# Patient Record
Sex: FEMALE | Race: WHITE | NOT HISPANIC OR LATINO | URBAN - METROPOLITAN AREA
[De-identification: names, ages, dates, MRNs, and addresses within clinical notes are randomized per-mention and may not be internally consistent; named-entity substitution may affect disease eponyms.]

---

## 2017-03-28 ENCOUNTER — APPOINTMENT (RX ONLY)
Dept: URBAN - METROPOLITAN AREA CLINIC 77 | Facility: CLINIC | Age: 42
Setting detail: DERMATOLOGY
End: 2017-03-28

## 2017-03-28 DIAGNOSIS — L81.4 OTHER MELANIN HYPERPIGMENTATION: ICD-10-CM

## 2017-03-28 DIAGNOSIS — L82.1 OTHER SEBORRHEIC KERATOSIS: ICD-10-CM

## 2017-03-28 DIAGNOSIS — L72.0 EPIDERMAL CYST: ICD-10-CM

## 2017-03-28 DIAGNOSIS — D18.0 HEMANGIOMA: ICD-10-CM

## 2017-03-28 DIAGNOSIS — L90.5 SCAR CONDITIONS AND FIBROSIS OF SKIN: ICD-10-CM

## 2017-03-28 DIAGNOSIS — D22 MELANOCYTIC NEVI: ICD-10-CM

## 2017-03-28 DIAGNOSIS — Z41.9 ENCOUNTER FOR PROCEDURE FOR PURPOSES OTHER THAN REMEDYING HEALTH STATE, UNSPECIFIED: ICD-10-CM

## 2017-03-28 PROBLEM — D22.5 MELANOCYTIC NEVI OF TRUNK: Status: ACTIVE | Noted: 2017-03-28

## 2017-03-28 PROBLEM — D18.01 HEMANGIOMA OF SKIN AND SUBCUTANEOUS TISSUE: Status: ACTIVE | Noted: 2017-03-28

## 2017-03-28 PROBLEM — D22.62 MELANOCYTIC NEVI OF LEFT UPPER LIMB, INCLUDING SHOULDER: Status: ACTIVE | Noted: 2017-03-28

## 2017-03-28 PROCEDURE — ? PRESCRIPTION

## 2017-03-28 PROCEDURE — ? OBSERVATION AND MEASURE

## 2017-03-28 PROCEDURE — 99214 OFFICE O/P EST MOD 30 MIN: CPT | Mod: 25

## 2017-03-28 PROCEDURE — ? ACNE SURGERY

## 2017-03-28 PROCEDURE — ? BOTOX (U OR CC)

## 2017-03-28 PROCEDURE — 10040 EXTRACTION: CPT

## 2017-03-28 PROCEDURE — ? COUNSELING

## 2017-03-28 RX ORDER — POLYDIMETHYLSILOXANES/SILICON
GEL (GRAM) TOPICAL TWICE DAILY
Qty: 1 | Refills: 6 | Status: ERX | COMMUNITY
Start: 2017-03-28

## 2017-03-28 RX ADMIN — Medication: at 16:17

## 2017-03-28 ASSESSMENT — LOCATION SIMPLE DESCRIPTION DERM
LOCATION SIMPLE: CHEST
LOCATION SIMPLE: LEFT INDEX FINGER
LOCATION SIMPLE: RIGHT UPPER BACK
LOCATION SIMPLE: ABDOMEN
LOCATION SIMPLE: LEFT LIP
LOCATION SIMPLE: LEFT CHEEK
LOCATION SIMPLE: RIGHT CHEEK

## 2017-03-28 ASSESSMENT — LOCATION DETAILED DESCRIPTION DERM
LOCATION DETAILED: MIDDLE STERNUM
LOCATION DETAILED: LEFT RIB CAGE
LOCATION DETAILED: LEFT CENTRAL BUCCAL CHEEK
LOCATION DETAILED: EPIGASTRIC SKIN
LOCATION DETAILED: LEFT PROXIMAL DORSAL INDEX FINGER
LOCATION DETAILED: RIGHT LATERAL ABDOMEN
LOCATION DETAILED: LEFT MEDIAL BUCCAL CHEEK
LOCATION DETAILED: LEFT INFERIOR MEDIAL MALAR CHEEK
LOCATION DETAILED: LEFT NASOLABIAL FOLD
LOCATION DETAILED: RIGHT INFERIOR CENTRAL MALAR CHEEK
LOCATION DETAILED: RIGHT MID-UPPER BACK

## 2017-03-28 ASSESSMENT — LOCATION ZONE DERM
LOCATION ZONE: FINGER
LOCATION ZONE: LIP
LOCATION ZONE: FACE
LOCATION ZONE: TRUNK

## 2017-03-28 NOTE — PROCEDURE: BOTOX (U OR CC)
Document As Units Or Cc?: units
Periorbital Skin Units/Cc: 20
Masseter Units: 0
Topical Anesthesia?: BLT cream (benzocaine 20%, lidocaine 6%, tetracaine 4%)
Post-Care Instructions: Patient instructed to not lie down for 4 hours and limit physical activity for 24 hours. Patient instructed not to travel by airplane for 48 hours.
Dilution (U/0.1 Cc): 4
Consent: Written consent obtained. Risks include but not limited to lid/brow ptosis, bruising, swelling, diplopia, temporary effect, incomplete chemical denervation.
Forehead Units/Cc: 12
Detail Level: Zone

## 2017-03-28 NOTE — PROCEDURE: ACNE SURGERY
Render Post-Care Instructions In Note?: no
Post-Care Instructions: I reviewed with the patient in detail post-care instructions. Patient is to keep the treatment areas dry overnight, and then apply bacitracin twice daily until healed. Patient may apply hydrogen peroxide soaks to remove any crusting.
Detail Level: Detailed
Consent was obtained and risks were reviewed including but not limited to scarring, infection, bleeding, scabbing, incomplete removal, and allergy to anesthesia.
Extraction Method: electrocautery
Acne Type: Comedonal Lesions
Prep Text (Optional): Prior to removal the treatment areas were prepped in the usual fashion.

## 2017-04-18 ENCOUNTER — APPOINTMENT (RX ONLY)
Dept: URBAN - METROPOLITAN AREA CLINIC 46 | Facility: CLINIC | Age: 42
Setting detail: DERMATOLOGY
End: 2017-04-18

## 2017-04-18 DIAGNOSIS — Z41.9 ENCOUNTER FOR PROCEDURE FOR PURPOSES OTHER THAN REMEDYING HEALTH STATE, UNSPECIFIED: ICD-10-CM

## 2017-04-18 PROCEDURE — ? IPL COSMETIC

## 2017-04-18 ASSESSMENT — LOCATION DETAILED DESCRIPTION DERM
LOCATION DETAILED: SUPERIOR MID FOREHEAD
LOCATION DETAILED: PHILTRUM
LOCATION DETAILED: NASAL SUPRATIP
LOCATION DETAILED: LEFT CHIN
LOCATION DETAILED: LEFT INFERIOR CENTRAL MALAR CHEEK
LOCATION DETAILED: RIGHT INFERIOR CENTRAL MALAR CHEEK

## 2017-04-18 ASSESSMENT — LOCATION SIMPLE DESCRIPTION DERM
LOCATION SIMPLE: RIGHT CHEEK
LOCATION SIMPLE: NOSE
LOCATION SIMPLE: UPPER LIP
LOCATION SIMPLE: SUPERIOR FOREHEAD
LOCATION SIMPLE: LEFT CHEEK
LOCATION SIMPLE: CHIN

## 2017-04-18 ASSESSMENT — LOCATION ZONE DERM
LOCATION ZONE: LIP
LOCATION ZONE: FACE
LOCATION ZONE: NOSE

## 2017-04-18 NOTE — HPI: COSMETIC (LASER BROWN SPOTS)
Have You Had Laser Removal Of Brown Spots Before?: has not had previous treatment
When Outside In The Sun, Do You...: mostly tans, rarely burns

## 2017-05-16 ENCOUNTER — APPOINTMENT (RX ONLY)
Dept: URBAN - METROPOLITAN AREA CLINIC 77 | Facility: CLINIC | Age: 42
Setting detail: DERMATOLOGY
End: 2017-05-16

## 2017-05-16 DIAGNOSIS — Z41.9 ENCOUNTER FOR PROCEDURE FOR PURPOSES OTHER THAN REMEDYING HEALTH STATE, UNSPECIFIED: ICD-10-CM

## 2017-05-16 PROCEDURE — ? IPL COSMETIC

## 2017-05-16 ASSESSMENT — LOCATION SIMPLE DESCRIPTION DERM
LOCATION SIMPLE: CHIN
LOCATION SIMPLE: RIGHT FOREHEAD
LOCATION SIMPLE: NOSE
LOCATION SIMPLE: LEFT CHEEK
LOCATION SIMPLE: RIGHT CHEEK

## 2017-05-16 ASSESSMENT — LOCATION DETAILED DESCRIPTION DERM
LOCATION DETAILED: NASAL SUPRATIP
LOCATION DETAILED: RIGHT MEDIAL FOREHEAD
LOCATION DETAILED: RIGHT INFERIOR CENTRAL MALAR CHEEK
LOCATION DETAILED: LEFT CHIN
LOCATION DETAILED: LEFT INFERIOR CENTRAL MALAR CHEEK

## 2017-05-16 ASSESSMENT — LOCATION ZONE DERM
LOCATION ZONE: FACE
LOCATION ZONE: NOSE

## 2017-05-16 NOTE — PROCEDURE: IPL COSMETIC
Pulse Energy (Include Units): 12 and 13j/cm2 (spotx tx)
Price (Use Numbers Only, No Special Characters Or $): 200.00
Total Pulses: 211
Coolin% cooling
Number Of Passes: 1 and spot tx
Spotsize (Include Units): 5mm
Detail Level: Zone
Consent: Prior to the procedure consent obtained, risks reviewed including but not limited to crusting, scabbing, blistering, scarring, darker or lighter pigmentary change, incidental hair removal, bruising, and/or incomplete removal.
Pre-Procedure Care: Prior to the procedure the patient was cleansed with Aha/ Bha prior to procedure. Patient was present had protective eyewear in place and a warning sign was placed on the door.
End-Point And Post-Procedure Care: The procedure continued until mild purpura was noted.  Immediately following the procedure, Elta laser enzyme gel, spf and ice applied. Post care reviewed with patient.
Treatment Number: 2
Eye Protection: Laser Aid
Post-Care Instructions: I reviewed with the patient in detail post-care instructions. Patient should stay away from the sun and wear sun protection until treated areas are fully healed. Use La Roche Posey B5 Balm given in samples. Also after 2-3 days begin normal skincare regimen (gave her samples of hyroboost, CeraVe cleanser, skinbetter daily cream and lines cream).
Ipl Type: Venus Versa 
Fluence (Include Units): 10

## 2017-06-16 ENCOUNTER — APPOINTMENT (RX ONLY)
Dept: URBAN - METROPOLITAN AREA CLINIC 77 | Facility: CLINIC | Age: 42
Setting detail: DERMATOLOGY
End: 2017-06-16

## 2017-06-16 DIAGNOSIS — Z41.9 ENCOUNTER FOR PROCEDURE FOR PURPOSES OTHER THAN REMEDYING HEALTH STATE, UNSPECIFIED: ICD-10-CM

## 2017-06-16 PROCEDURE — ? BOTOX (U OR CC)

## 2017-06-16 PROCEDURE — ? OTHER

## 2017-06-16 PROCEDURE — ? FILLERS (CC)

## 2017-06-16 ASSESSMENT — LOCATION SIMPLE DESCRIPTION DERM
LOCATION SIMPLE: RIGHT CHEEK
LOCATION SIMPLE: RIGHT LIP

## 2017-06-16 ASSESSMENT — LOCATION DETAILED DESCRIPTION DERM
LOCATION DETAILED: RIGHT SUPERIOR LATERAL MALAR CHEEK
LOCATION DETAILED: RIGHT UPPER CUTANEOUS LIP

## 2017-06-16 ASSESSMENT — LOCATION ZONE DERM
LOCATION ZONE: LIP
LOCATION ZONE: FACE

## 2017-06-16 NOTE — PROCEDURE: BOTOX (U OR CC)
Dilution (U/0.1 Cc): 4
Periorbital Skin Units/Cc: 12
Consent: Written consent obtained. Risks include but not limited to lid/brow ptosis, bruising, swelling, diplopia, temporary effect, incomplete chemical denervation.
Additional Area 4 Units: 0
Detail Level: Zone
Document As Units Or Cc?: units
Expiration Date (Month Year): 12/2019
Post-Care Instructions: Patient instructed to not lie down for 4 hours and limit physical activity for 24 hours. Patient instructed not to travel by airplane for 48 hours.
Lot #: [id number]
Quantity Per Injection Site (Units Or Cc): 2 U

## 2017-06-16 NOTE — PROCEDURE: FILLERS (CC)
Post-Care Instructions: Patient instructed to apply ice to reduce swelling.
Topical Anesthesia?: BLT cream (benzocaine 20%, lidocaine 6%, tetracaine 4%)
Lot #: [id number]
Marionette Lines Filler  Volume In Cc: 0
Detail Level: Detailed
Consent: Written consent obtained. Risks include but not limited to bruising, beading, irregular texture, ulceration, infection, allergic reaction, scar formation, incomplete augmentation, temporary nature, procedural pain.
Expiration Date (Month Year): 01/22/19
Filler: Juvederm Vollure XC
Quantity Per Injection Site (Cc): 1.0 cc

## 2017-06-16 NOTE — PROCEDURE: OTHER
Detail Level: Zone
Other (Free Text): Recommended voluma and sculptura for volume loss on the cheek bones
Note Text (......Xxx Chief Complaint.): This diagnosis correlates with the

## 2017-09-21 ENCOUNTER — APPOINTMENT (RX ONLY)
Dept: URBAN - METROPOLITAN AREA CLINIC 77 | Facility: CLINIC | Age: 42
Setting detail: DERMATOLOGY
End: 2017-09-21

## 2017-09-21 DIAGNOSIS — Z41.9 ENCOUNTER FOR PROCEDURE FOR PURPOSES OTHER THAN REMEDYING HEALTH STATE, UNSPECIFIED: ICD-10-CM

## 2017-09-21 PROCEDURE — ? BOTOX (U OR CC)

## 2017-09-21 ASSESSMENT — LOCATION DETAILED DESCRIPTION DERM: LOCATION DETAILED: LEFT INFERIOR MEDIAL FOREHEAD

## 2017-09-21 ASSESSMENT — LOCATION SIMPLE DESCRIPTION DERM: LOCATION SIMPLE: LEFT FOREHEAD

## 2017-09-21 ASSESSMENT — LOCATION ZONE DERM: LOCATION ZONE: FACE

## 2017-09-21 NOTE — PROCEDURE: BOTOX (U OR CC)
Depressor Anguli Oris Units: 0
Post-Care Instructions: Patient instructed to not lie down for 4 hours and limit physical activity for 24 hours. Patient instructed not to travel by airplane for 48 hours.
Detail Level: Zone
Expiration Date (Month Year): 04/2020
Lot #: [id number]
Forehead Units/Cc: 10
Price (Use Numbers Only, No Special Characters Or $): 12
Dilution (U/0.1 Cc): 4
Periorbital Skin Units/Cc: 20
Glabellar Complex Units: 24
Document As Units Or Cc?: units
Consent: Written consent obtained. Risks include but not limited to lid/brow ptosis, bruising, swelling, diplopia, temporary effect, incomplete chemical denervation.

## 2017-09-21 NOTE — PROCEDURE: BOTOX (U OR CC)
Dilution (U/0.1 Cc): 4
Levator Labii Superioris Units: 0
Document As Units Or Cc?: units
Lot #: [id number]
Forehead Units/Cc: 10
Consent: Written consent obtained. Risks include but not limited to lid/brow ptosis, bruising, swelling, diplopia, temporary effect, incomplete chemical denervation.
Periorbital Skin Units/Cc: 20
Price (Use Numbers Only, No Special Characters Or $): 12
Glabellar Complex Units: 24
Post-Care Instructions: Patient instructed to not lie down for 4 hours and limit physical activity for 24 hours. Patient instructed not to travel by airplane for 48 hours.
Expiration Date (Month Year): 2/2020
Detail Level: Zone
Quantity Per Injection Site (Units Or Cc): 4 U

## 2018-01-04 ENCOUNTER — APPOINTMENT (RX ONLY)
Dept: URBAN - METROPOLITAN AREA CLINIC 77 | Facility: CLINIC | Age: 43
Setting detail: DERMATOLOGY
End: 2018-01-04

## 2018-01-04 DIAGNOSIS — Z41.9 ENCOUNTER FOR PROCEDURE FOR PURPOSES OTHER THAN REMEDYING HEALTH STATE, UNSPECIFIED: ICD-10-CM

## 2018-01-04 PROCEDURE — ? OTHER

## 2018-01-04 ASSESSMENT — LOCATION ZONE DERM: LOCATION ZONE: FACE

## 2018-01-04 ASSESSMENT — LOCATION SIMPLE DESCRIPTION DERM: LOCATION SIMPLE: RIGHT CHEEK

## 2018-01-04 ASSESSMENT — LOCATION DETAILED DESCRIPTION DERM: LOCATION DETAILED: RIGHT SUPERIOR LATERAL MALAR CHEEK

## 2018-01-09 ENCOUNTER — APPOINTMENT (RX ONLY)
Dept: URBAN - METROPOLITAN AREA CLINIC 77 | Facility: CLINIC | Age: 43
Setting detail: DERMATOLOGY
End: 2018-01-09

## 2018-01-09 DIAGNOSIS — Z41.9 ENCOUNTER FOR PROCEDURE FOR PURPOSES OTHER THAN REMEDYING HEALTH STATE, UNSPECIFIED: ICD-10-CM

## 2018-01-09 PROCEDURE — ? SCULPTRA

## 2018-01-09 ASSESSMENT — LOCATION SIMPLE DESCRIPTION DERM
LOCATION SIMPLE: RIGHT TEMPLE
LOCATION SIMPLE: LEFT FOREHEAD
LOCATION SIMPLE: RIGHT CHEEK
LOCATION SIMPLE: LEFT CHEEK

## 2018-01-09 ASSESSMENT — LOCATION DETAILED DESCRIPTION DERM
LOCATION DETAILED: LEFT MEDIAL MALAR CHEEK
LOCATION DETAILED: RIGHT CENTRAL TEMPLE
LOCATION DETAILED: RIGHT CENTRAL MALAR CHEEK
LOCATION DETAILED: LEFT INFERIOR FOREHEAD
LOCATION DETAILED: RIGHT SUPERIOR LATERAL MALAR CHEEK
LOCATION DETAILED: LEFT SUPERIOR CENTRAL MALAR CHEEK

## 2018-01-09 ASSESSMENT — LOCATION ZONE DERM: LOCATION ZONE: FACE

## 2018-01-09 NOTE — PROCEDURE: SCULPTRA
Dilution Method: The Sculptra was diluted with 5 ml of sterile water for a total volume of 9ccs for each vial.
Dorsal Hands Sculptra Volume In Cc: 0
Post-Care Instructions: Patient instructed to apply ice to reduce swelling.
Lot #: 
Expiration Date (Month Year): 03/31/2020
Cheeks Volume In Cc: 6
Use Map Statement For Sites (Optional): No
Treatment Number: 1
Consent: Written consent obtained. Risks include but not limited to bruising, beading, irregular texture, ulceration, infection, allergic reaction, scar formation, incomplete augmentation, temporary nature, procedural pain.
Volumizer: Sculptra
Temple Hollows Volume In Cc: 2
Render Post-Care In The Note: Yes
Detail Level: Simple
Injection Technique: The Sculptra was injected to the listed areas after cleansing the skin and providing appropriate anesthesia.
Map Statement: See Attached Map for Complete Details.

## 2018-04-26 ENCOUNTER — APPOINTMENT (RX ONLY)
Dept: URBAN - METROPOLITAN AREA CLINIC 77 | Facility: CLINIC | Age: 43
Setting detail: DERMATOLOGY
End: 2018-04-26

## 2018-04-26 DIAGNOSIS — Z41.9 ENCOUNTER FOR PROCEDURE FOR PURPOSES OTHER THAN REMEDYING HEALTH STATE, UNSPECIFIED: ICD-10-CM

## 2018-04-26 PROCEDURE — ? BOTOX

## 2018-04-26 PROCEDURE — ? PRESCRIPTION

## 2018-04-26 PROCEDURE — ? KYBELLA INJECTION

## 2018-04-26 PROCEDURE — ? COSMETIC CONSULTATION: BOTULINUM TOXIN

## 2018-04-26 RX ORDER — PREDNISONE 20 MG/1
TABLET ORAL
Qty: 3 | Refills: 0 | Status: ERX | COMMUNITY
Start: 2018-04-26

## 2018-04-26 RX ADMIN — PREDNISONE: 20 TABLET ORAL at 20:02

## 2018-04-26 NOTE — PROCEDURE: BOTOX
Masseter Units: 0
Price (Use Numbers Only, No Special Characters Or $): 12
Consent: Written consent obtained. Risks include but not limited to lid/brow ptosis, bruising, swelling, diplopia, temporary effect, incomplete chemical denervation.
Additional Area 1 Units: 8
Dilution (U/0.1 Cc): 4
Detail Level: Detailed
Lot #: [id number]
Additional Area 1 Location: Brow Lift
Post-Care Instructions: Patient instructed to not lie down for 4 hours and limit physical activity for 24 hours. Patient instructed not to travel by airplane for 48 hours.
Expiration Date (Month Year): 06/2020
Periorbital Skin Units: 16

## 2018-04-26 NOTE — PROCEDURE: KYBELLA INJECTION
Anesthesia Volume In Cc: 0.5
Price (Use Numbers Only, No Special Characters Or $): 366
Kybella Volume In Cc (Won't Render If 0): 0
Treatment Number (Won't Render If 0): 1
Treatment Area: Submental Chin
Detail Level: Detailed
Expiration Date (Month Year): Aug. 2018
Lot #: [id number]
Post-Care Instructions: Patient instructed to apply ice to reduce swelling.
Topical Anesthesia?: 20% benzocaine, 8% lidocaine, and 8% tetracaine
Procedure Text: Name of Medication: Kybella (Deoxycholic Acid). \\nDose: 20mg/2mL. \\nRoute: Subcutaneous. \\nLocation: Submental fat. \\nAmount used: 1 vial.\\n\\nTopical anesthesia was achieved with 20% benzocaine, 8% lidocaine, 4% tetracaine. Patient instructed to apply ice to reduce swelling. \\n\\nPrior to treatment, palpated the submental area to ensure sufficient submental fat in the target treatment area. Outline the planned treatment area with a surgical pen and applied a 1 cm injection grid to lyubov the injection sites; made sure to not inject outside the defined parameters. \\n\\nUsing a large bore needle, earnestine 1 mL at a time from Kybella vial into a sterile 1 mL syringe. \\n\\nPatient was asked to tense the platysma. While pinching the submental fat and, using a 30 gauge and 0.5 inch needle, injected 0.2 mL into the preplatysmal fat next to each of the marked injection sites by advancing the needle perpendicular to the skin. Injected into fat tissue at the depth of approximately mid-way into the subcutaneous fat layer; avoiding injection into the postplatysmal fat. Upon needle withdrawal, pressure was applied to each injection site as necessary to minimize bleeding; an adhesive dressing was applied. Ice/cold packs encouraged to help with pain and swelling. \\n\\nWritten consent obtained. Risks include but not limited to bruising, beading, irregular texture, ulceration, infection, allergic reaction, scar formation, incomplete augmentation, temporary nature, procedural pain. \\n\\nAdministrated by: Dr. Lim. \\n\\nPt tolerated well. Pt was observed for 30 minutes following injection for adverse reaction
Consent: Written consent obtained. Risks include but not limited to bruising, beading, irregular texture, ulceration, infection, allergic reaction, scar formation, incomplete augmentation, temporary nature, procedural pain.

## 2018-08-16 ENCOUNTER — APPOINTMENT (RX ONLY)
Dept: URBAN - METROPOLITAN AREA CLINIC 77 | Facility: CLINIC | Age: 43
Setting detail: DERMATOLOGY
End: 2018-08-16

## 2018-08-16 DIAGNOSIS — Z41.9 ENCOUNTER FOR PROCEDURE FOR PURPOSES OTHER THAN REMEDYING HEALTH STATE, UNSPECIFIED: ICD-10-CM

## 2018-08-16 DIAGNOSIS — L81.1 CHLOASMA: ICD-10-CM

## 2018-08-16 DIAGNOSIS — L72.8 OTHER FOLLICULAR CYSTS OF THE SKIN AND SUBCUTANEOUS TISSUE: ICD-10-CM

## 2018-08-16 DIAGNOSIS — E85.4 ORGAN-LIMITED AMYLOIDOSIS: ICD-10-CM

## 2018-08-16 PROBLEM — D48.5 NEOPLASM OF UNCERTAIN BEHAVIOR OF SKIN: Status: ACTIVE | Noted: 2018-08-16

## 2018-08-16 PROCEDURE — ? PRESCRIPTION

## 2018-08-16 PROCEDURE — 99213 OFFICE O/P EST LOW 20 MIN: CPT

## 2018-08-16 PROCEDURE — ? COUNSELING

## 2018-08-16 PROCEDURE — ? BOTOX

## 2018-08-16 PROCEDURE — ? RECOMMENDATIONS

## 2018-08-16 PROCEDURE — ? TREATMENT REGIMEN

## 2018-08-16 PROCEDURE — ? COSMETIC CONSULTATION: BOTULINUM TOXIN

## 2018-08-16 RX ORDER — TRETINOIN 0.25 MG/G
CREAM TOPICAL QHS
Qty: 1 | Refills: 6 | Status: ERX | COMMUNITY
Start: 2018-08-16

## 2018-08-16 RX ORDER — TRIAMCINOLONE ACETONIDE 5 MG/G
CREAM TOPICAL
Qty: 1 | Refills: 6 | Status: ERX | COMMUNITY
Start: 2018-08-16

## 2018-08-16 RX ADMIN — TRIAMCINOLONE ACETONIDE: 5 CREAM TOPICAL at 19:55

## 2018-08-16 RX ADMIN — TRETINOIN: 0.25 CREAM TOPICAL at 20:07

## 2018-08-16 ASSESSMENT — LOCATION SIMPLE DESCRIPTION DERM
LOCATION SIMPLE: INFERIOR FOREHEAD
LOCATION SIMPLE: RIGHT POSTERIOR UPPER ARM
LOCATION SIMPLE: LEFT CHEEK
LOCATION SIMPLE: UPPER BACK

## 2018-08-16 ASSESSMENT — LOCATION ZONE DERM
LOCATION ZONE: TRUNK
LOCATION ZONE: FACE
LOCATION ZONE: ARM

## 2018-08-16 ASSESSMENT — LOCATION DETAILED DESCRIPTION DERM
LOCATION DETAILED: INFERIOR THORACIC SPINE
LOCATION DETAILED: LEFT LATERAL BUCCAL CHEEK
LOCATION DETAILED: INFERIOR MID FOREHEAD
LOCATION DETAILED: RIGHT DISTAL POSTERIOR UPPER ARM

## 2018-08-16 NOTE — HPI: SKIN LESION
Is This A New Presentation, Or A Follow-Up?: Skin Lesion
Has Your Skin Lesion Been Treated?: not been treated
Additional History: Pt reports lesion is smaller than it originally started out as. Pt denies any drainage

## 2018-08-16 NOTE — PROCEDURE: RECOMMENDATIONS
Detail Level: Zone
Recommendation Preamble: The following recommendations were made during the visit: Referral for sculptura on the cheeks in September

## 2018-08-16 NOTE — HPI: DISCOLORATION (MELASMA)
How Severe Are They?: moderate
Is This A New Presentation, Or A Follow-Up?: Discoloration
Additional History: Pt would like to discuss treatment options

## 2018-08-16 NOTE — HPI: RASH (PIGMENTED PURPURA DERMATOSIS)
How Severe Is It?: mild
Is This A New Presentation, Or A Follow-Up?: Rash
Additional History: Pt reports her mother has a similar rash and believed it is most common in Hispanics. She would like to discuss treatment options today

## 2018-08-16 NOTE — PROCEDURE: BOTOX
Glabellar Complex Units: 0
Consent: Written consent obtained. Risks include but not limited to lid/brow ptosis, bruising, swelling, diplopia, temporary effect, incomplete chemical denervation.
Forehead Units: 16
Lot #: [id number]
Expiration Date (Month Year): 11/2020
Periorbital Skin Units: 24
Price (Use Numbers Only, No Special Characters Or $): 12
Dilution (U/0.1 Cc): 4
Detail Level: Detailed
Post-Care Instructions: Patient instructed to not lie down for 4 hours and limit physical activity for 24 hours. Patient instructed not to travel by airplane for 48 hours.

## 2018-09-18 ENCOUNTER — APPOINTMENT (RX ONLY)
Dept: URBAN - METROPOLITAN AREA CLINIC 77 | Facility: CLINIC | Age: 43
Setting detail: DERMATOLOGY
End: 2018-09-18

## 2018-09-18 DIAGNOSIS — Z41.9 ENCOUNTER FOR PROCEDURE FOR PURPOSES OTHER THAN REMEDYING HEALTH STATE, UNSPECIFIED: ICD-10-CM

## 2018-09-18 PROCEDURE — ? SCULPTRA

## 2018-09-18 ASSESSMENT — LOCATION DETAILED DESCRIPTION DERM
LOCATION DETAILED: RIGHT CENTRAL MALAR CHEEK
LOCATION DETAILED: LEFT CENTRAL MALAR CHEEK

## 2018-09-18 ASSESSMENT — LOCATION ZONE DERM: LOCATION ZONE: FACE

## 2018-09-18 ASSESSMENT — LOCATION SIMPLE DESCRIPTION DERM
LOCATION SIMPLE: LEFT CHEEK
LOCATION SIMPLE: RIGHT CHEEK

## 2018-09-18 NOTE — PROCEDURE: SCULPTRA
Treatment Number: 2
Additional Area 1 Volume In Cc: 0
Lot #: 
Anesthesia Volume In Cc: 0.5
Map Statement: See Attached Map for Complete Details.
Consent: Written consent obtained. Risks include but not limited to bruising, beading, irregular texture, ulceration, infection, allergic reaction, scar formation, incomplete augmentation, temporary nature, procedural pain.
Render Post-Care In The Note: Yes
Post-Care Instructions: Patient instructed to apply ice to reduce swelling.
Use Map Statement For Sites (Optional): No
Injection Technique: The Sculptra was injected to the listed areas after cleansing the skin and providing appropriate anesthesia.
Price (Use Numbers Only, No Special Characters Or $): 514
Expiration Date (Month Year): 10/31/2020
Cheeks Volume In Cc: 4
Dilution Method: The Sculptra was diluted with 5 ml of sterile water for a total volume of 9ccs for each vial.
Additional Anesthesia Volume In Cc: 6
Vials Reconstituted: 1
Volumizer: Sculptra
Detail Level: Detailed
Anesthesia Type: 1% lidocaine with epinephrine
Topical Anesthesia?: BLT cream (benzocaine 20%, lidocaine 10%, tetracaine 10%)

## 2019-01-29 ENCOUNTER — APPOINTMENT (RX ONLY)
Dept: URBAN - METROPOLITAN AREA CLINIC 77 | Facility: CLINIC | Age: 44
Setting detail: DERMATOLOGY
End: 2019-01-29

## 2019-01-29 DIAGNOSIS — Z41.9 ENCOUNTER FOR PROCEDURE FOR PURPOSES OTHER THAN REMEDYING HEALTH STATE, UNSPECIFIED: ICD-10-CM

## 2019-01-29 DIAGNOSIS — L72.0 EPIDERMAL CYST: ICD-10-CM

## 2019-01-29 PROCEDURE — ? COSMETIC CONSULTATION: BOTULINUM TOXIN

## 2019-01-29 PROCEDURE — ? BOTOX

## 2019-01-29 PROCEDURE — ? COUNSELING

## 2019-01-29 PROCEDURE — ? OTHER

## 2019-01-29 ASSESSMENT — LOCATION DETAILED DESCRIPTION DERM
LOCATION DETAILED: LEFT MEDIAL FOREHEAD
LOCATION DETAILED: LEFT LOWER CUTANEOUS LIP
LOCATION DETAILED: LEFT CENTRAL MALAR CHEEK

## 2019-01-29 ASSESSMENT — LOCATION ZONE DERM
LOCATION ZONE: FACE
LOCATION ZONE: LIP

## 2019-01-29 ASSESSMENT — LOCATION SIMPLE DESCRIPTION DERM
LOCATION SIMPLE: LEFT FOREHEAD
LOCATION SIMPLE: LEFT LIP
LOCATION SIMPLE: LEFT CHEEK

## 2019-01-29 NOTE — PROCEDURE: OTHER
Other (Free Text): Courtesy ED and manual extraction performed
Note Text (......Xxx Chief Complaint.): This diagnosis correlates with the
Detail Level: Detailed

## 2019-01-29 NOTE — PROCEDURE: BOTOX
Additional Area 2 Units: 0
Consent: Written consent obtained. Risks include but not limited to lid/brow ptosis, bruising, swelling, diplopia, temporary effect, incomplete chemical denervation.
Periorbital Skin Units: 24
Detail Level: Detailed
Dilution (U/0.1 Cc): 4
Glabellar Complex Units: 12
Post-Care Instructions: Patient instructed to not lie down for 4 hours and limit physical activity for 24 hours. Patient instructed not to travel by airplane for 48 hours.
Forehead Units: 16

## 2019-05-02 ENCOUNTER — APPOINTMENT (RX ONLY)
Dept: URBAN - METROPOLITAN AREA CLINIC 77 | Facility: CLINIC | Age: 44
Setting detail: DERMATOLOGY
End: 2019-05-02

## 2019-05-02 DIAGNOSIS — Z41.9 ENCOUNTER FOR PROCEDURE FOR PURPOSES OTHER THAN REMEDYING HEALTH STATE, UNSPECIFIED: ICD-10-CM

## 2019-05-02 DIAGNOSIS — B36.0 PITYRIASIS VERSICOLOR: ICD-10-CM

## 2019-05-02 PROCEDURE — ? COUNSELING

## 2019-05-02 PROCEDURE — ? PRESCRIPTION

## 2019-05-02 PROCEDURE — ? BOTOX

## 2019-05-02 PROCEDURE — ? COSMETIC CONSULTATION: BOTULINUM TOXIN

## 2019-05-02 RX ORDER — FLUCONAZOLE 150 MG/1
TABLET ORAL
Qty: 15 | Refills: 0 | Status: ERX | COMMUNITY
Start: 2019-05-02

## 2019-05-02 RX ORDER — ECONAZOLE NITRATE 10 MG/G
CREAM TOPICAL
Qty: 1 | Refills: 6 | Status: ERX | COMMUNITY
Start: 2019-05-02

## 2019-05-02 RX ADMIN — FLUCONAZOLE: 150 TABLET ORAL at 15:35

## 2019-05-02 RX ADMIN — ECONAZOLE NITRATE: 10 CREAM TOPICAL at 15:31

## 2019-05-02 ASSESSMENT — LOCATION DETAILED DESCRIPTION DERM: LOCATION DETAILED: EPIGASTRIC SKIN

## 2019-05-02 ASSESSMENT — LOCATION ZONE DERM: LOCATION ZONE: TRUNK

## 2019-05-02 ASSESSMENT — LOCATION SIMPLE DESCRIPTION DERM: LOCATION SIMPLE: ABDOMEN

## 2019-05-02 NOTE — PROCEDURE: BOTOX
Dilution (U/0.1 Cc): 4
Additional Area 6 Units: 0
Price (Use Numbers Only, No Special Characters Or $): 13
Periorbital Skin Units: 24
Consent: Written consent obtained. Risks include but not limited to lid/brow ptosis, bruising, swelling, diplopia, temporary effect, incomplete chemical denervation.
Post-Care Instructions: Patient instructed to not lie down for 4 hours and limit physical activity for 24 hours. Patient instructed not to travel by airplane for 48 hours.
Lot #: [id number]
Expiration Date (Month Year): 08/2021
Detail Level: Detailed

## 2019-08-08 ENCOUNTER — APPOINTMENT (RX ONLY)
Dept: URBAN - METROPOLITAN AREA CLINIC 77 | Facility: CLINIC | Age: 44
Setting detail: DERMATOLOGY
End: 2019-08-08

## 2019-08-08 DIAGNOSIS — L90.5 SCAR CONDITIONS AND FIBROSIS OF SKIN: ICD-10-CM

## 2019-08-08 DIAGNOSIS — Z41.9 ENCOUNTER FOR PROCEDURE FOR PURPOSES OTHER THAN REMEDYING HEALTH STATE, UNSPECIFIED: ICD-10-CM

## 2019-08-08 PROCEDURE — ? COUNSELING

## 2019-08-08 PROCEDURE — ? PRESCRIPTION

## 2019-08-08 PROCEDURE — ? TREATMENT REGIMEN

## 2019-08-08 PROCEDURE — ? COSMETIC CONSULTATION: BOTULINUM TOXIN

## 2019-08-08 PROCEDURE — ? BOTOX

## 2019-08-08 RX ORDER — POLYDIMETHYLSILOXANES/SILICON
GEL (GRAM) TOPICAL
Qty: 1 | Refills: 6 | Status: ERX

## 2019-08-08 ASSESSMENT — LOCATION ZONE DERM
LOCATION ZONE: TRUNK
LOCATION ZONE: FACE

## 2019-08-08 ASSESSMENT — LOCATION SIMPLE DESCRIPTION DERM
LOCATION SIMPLE: RIGHT BREAST
LOCATION SIMPLE: LEFT FOREHEAD
LOCATION SIMPLE: LEFT BREAST

## 2019-08-08 ASSESSMENT — LOCATION DETAILED DESCRIPTION DERM
LOCATION DETAILED: LEFT MEDIAL FOREHEAD
LOCATION DETAILED: LEFT INFRAMAMMARY CREASE
LOCATION DETAILED: RIGHT INFRAMAMMARY CREASE (INNER QUADRANT)

## 2019-08-08 NOTE — PROCEDURE: BOTOX
Additional Area 5 Units: 0
Post-Care Instructions: Patient instructed to not lie down for 4 hours and limit physical activity for 24 hours. Patient instructed not to travel by airplane for 48 hours.
Expiration Date (Month Year): 06/2020
Consent: Written consent obtained. Risks include but not limited to lid/brow ptosis, bruising, swelling, diplopia, temporary effect, incomplete chemical denervation.
Price (Use Numbers Only, No Special Characters Or $): 13
Dilution (U/0.1 Cc): 4
Forehead Units: 8
Periorbital Skin Units: 24
Glabellar Complex Units: 6
Lot #: [id number]
Detail Level: Zone

## 2019-08-08 NOTE — HPI: OTHER
Condition:: Cosmetic (botox)
Please Describe Your Condition:: comes in for Botox of the forehead, glabella, periorbital skin for wrinkles. She has had botox (5/2/2019) with the following response: improvement of wrinkles and no complications. Pertinent history includes: no history of a neuromuscular condition (e.g.: myasthenia gravis), no history of autoimmune disease, no history of hepatitis b infection, no history of hepatitis c infection, no history of HIV infection, and not currently breast feeding.

## 2019-11-21 ENCOUNTER — APPOINTMENT (RX ONLY)
Dept: URBAN - METROPOLITAN AREA CLINIC 77 | Facility: CLINIC | Age: 44
Setting detail: DERMATOLOGY
End: 2019-11-21

## 2019-11-21 DIAGNOSIS — Z41.9 ENCOUNTER FOR PROCEDURE FOR PURPOSES OTHER THAN REMEDYING HEALTH STATE, UNSPECIFIED: ICD-10-CM

## 2019-11-21 PROCEDURE — ? BOTOX

## 2019-11-21 PROCEDURE — ? COSMETIC CONSULTATION: BOTULINUM TOXIN

## 2019-11-21 ASSESSMENT — LOCATION SIMPLE DESCRIPTION DERM: LOCATION SIMPLE: LEFT FOREHEAD

## 2019-11-21 ASSESSMENT — LOCATION DETAILED DESCRIPTION DERM: LOCATION DETAILED: LEFT MEDIAL FOREHEAD

## 2019-11-21 ASSESSMENT — LOCATION ZONE DERM: LOCATION ZONE: FACE

## 2019-11-21 NOTE — PROCEDURE: BOTOX
Consent: Written consent obtained. Risks include but not limited to lid/brow ptosis, bruising, swelling, diplopia, temporary effect, incomplete chemical denervation.
Additional Area 1 Units: 0
Dilution (U/0.1 Cc): 4
Lot #: [id number]
Periorbital Skin Units: 24
Price (Use Numbers Only, No Special Characters Or $): 13
Expiration Date (Month Year): 06/2020
Detail Level: Zone
Post-Care Instructions: Patient instructed to not lie down for 4 hours and limit physical activity for 24 hours. Patient instructed not to travel by airplane for 48 hours.

## 2019-11-21 NOTE — HPI: OTHER
Condition:: Botox
Please Describe Your Condition:: comes in for Botox of the periorbital skin for wrinkles. She has had botox (8/8/19) with the following response: improvement of wrinkles and no complications. Pertinent history includes: no history of a neuromuscular condition (e.g.: myasthenia gravis), no history of autoimmune disease, no history of hepatitis b infection, no history of hepatitis c infection, no history of HIV infection, and *  not currently breast feeding.

## 2020-06-16 ENCOUNTER — APPOINTMENT (RX ONLY)
Dept: URBAN - METROPOLITAN AREA CLINIC 77 | Facility: CLINIC | Age: 45
Setting detail: DERMATOLOGY
End: 2020-06-16

## 2020-06-16 DIAGNOSIS — L65.8 OTHER SPECIFIED NONSCARRING HAIR LOSS: ICD-10-CM

## 2020-06-16 DIAGNOSIS — Z41.9 ENCOUNTER FOR PROCEDURE FOR PURPOSES OTHER THAN REMEDYING HEALTH STATE, UNSPECIFIED: ICD-10-CM

## 2020-06-16 DIAGNOSIS — D18.0 HEMANGIOMA: ICD-10-CM

## 2020-06-16 PROBLEM — D18.01 HEMANGIOMA OF SKIN AND SUBCUTANEOUS TISSUE: Status: ACTIVE | Noted: 2020-06-16

## 2020-06-16 PROCEDURE — ? COUNSELING

## 2020-06-16 PROCEDURE — ? SCULPTRA

## 2020-06-16 PROCEDURE — ? MEDICAL CONSULTATION: FILLERS

## 2020-06-16 PROCEDURE — ? COSMETIC CONSULTATION: BOTULINUM TOXIN

## 2020-06-16 PROCEDURE — ? BOTOX

## 2020-06-16 PROCEDURE — ? PRESCRIPTION

## 2020-06-16 PROCEDURE — ? TREATMENT REGIMEN

## 2020-06-16 RX ORDER — LATANOPROST 50 UG/ML
SOLUTION OPHTHALMIC
Qty: 5 | Refills: 3 | Status: ERX | COMMUNITY
Start: 2020-06-16

## 2020-06-16 RX ADMIN — LATANOPROST: 50 SOLUTION OPHTHALMIC at 00:00

## 2020-06-16 ASSESSMENT — LOCATION SIMPLE DESCRIPTION DERM
LOCATION SIMPLE: LEFT CHEEK
LOCATION SIMPLE: RIGHT CHEEK
LOCATION SIMPLE: LEFT THIGH
LOCATION SIMPLE: LEFT SUPERIOR EYELID
LOCATION SIMPLE: RIGHT THIGH
LOCATION SIMPLE: RIGHT SUPERIOR EYELID

## 2020-06-16 ASSESSMENT — LOCATION ZONE DERM
LOCATION ZONE: EYELID
LOCATION ZONE: LEG
LOCATION ZONE: FACE

## 2020-06-16 ASSESSMENT — LOCATION DETAILED DESCRIPTION DERM
LOCATION DETAILED: LEFT CENTRAL MALAR CHEEK
LOCATION DETAILED: LEFT LATERAL MALAR CHEEK
LOCATION DETAILED: RIGHT ANTERIOR PROXIMAL THIGH
LOCATION DETAILED: RIGHT SUPERIOR LATERAL MALAR CHEEK
LOCATION DETAILED: LEFT LATERAL SUPERIOR EYELID
LOCATION DETAILED: RIGHT LATERAL SUPERIOR EYELID
LOCATION DETAILED: RIGHT INFERIOR CENTRAL MALAR CHEEK
LOCATION DETAILED: LEFT ANTERIOR DISTAL THIGH

## 2020-06-16 NOTE — PROCEDURE: TREATMENT REGIMEN
Initiate Treatment: latanoprost 0.005 % eye drops apply to lash line  1-2X daily
Detail Level: Simple

## 2020-06-16 NOTE — PROCEDURE: SCULPTRA
Expiration Date (Month Year): 05/2021
Right Dorsal Hand Volume In Cc: 0
Show Right And Left Dorsal Hands Volume?: No
Show Chin Volume?: Yes
Topical Anesthesia?: BLT ointment (benzocaine 20%, lidocaine 10%, tetracaine 10%)
Injection Technique: The Sculptra was injected to the listed areas after cleansing the skin and providing appropriate anesthesia.
Zygomatic Arches Volume In Cc: 4
Detail Level: Detailed
Consent: Written consent obtained. Risks include but not limited to bruising, beading, irregular texture, ulceration, infection, allergic reaction, scar formation, incomplete augmentation, temporary nature, procedural pain.
Volumizer: Sculptra
Post-Care Instructions: Patient instructed to apply ice to reduce swelling.
Map Statement: See Attached Map for Complete Details.
Lot #: [id number]
Temple Hollows Volume In Cc: 2
Price (Use Numbers Only, No Special Characters Or $): 800.00
Dilution Method: The Sculptra was diluted with 5 ml of sterile water for a total volume of 9ccs for each vial.

## 2020-06-16 NOTE — PROCEDURE: BOTOX
Show Periorbital Units: Yes
Post-Care Instructions: Patient instructed to not lie down for 4 hours and limit physical activity for 24 hours. Patient instructed not to travel by airplane for 48 hours.
[facility] Units: 0
Glabellar Complex Units: 6
Price (Use Numbers Only, No Special Characters Or $): 13
Consent: Written consent obtained. Risks include but not limited to lid/brow ptosis, bruising, swelling, diplopia, temporary effect, incomplete chemical denervation.
Expiration Date (Month Year): 08/2022
Left Periorbital Units: 12
Dilution (U/0.1 Cc): 4
Forehead Units: 8
Lot #: [id number]
Detail Level: Detailed

## 2020-09-22 ENCOUNTER — APPOINTMENT (RX ONLY)
Dept: URBAN - METROPOLITAN AREA CLINIC 77 | Facility: CLINIC | Age: 45
Setting detail: DERMATOLOGY
End: 2020-09-22

## 2020-09-22 DIAGNOSIS — Z41.9 ENCOUNTER FOR PROCEDURE FOR PURPOSES OTHER THAN REMEDYING HEALTH STATE, UNSPECIFIED: ICD-10-CM

## 2020-09-22 PROCEDURE — ? BOTOX

## 2020-09-22 PROCEDURE — ? OTHER

## 2020-09-22 PROCEDURE — ? COSMETIC CONSULTATION: BOTULINUM TOXIN

## 2020-09-22 NOTE — HPI: OTHER
Condition:: Filler migration
Please Describe Your Condition:: Pt states she would like to have filler dissolved under the right eye. Pt reports having filler done at previous clinic. She feels the filler may have migrated underneath the eye.

## 2020-09-22 NOTE — PROCEDURE: OTHER
Note Text (......Xxx Chief Complaint.): This diagnosis correlates with the
Other (Free Text): Extensive discussion regarding possible filler migration and its effect on fat pad and laxity under eyes. Discussed consulting with oculoplastics for lower bleph to smooth and tighten skin. Can try hyaluronidase on f/u, did not perform today due to concerns that Botox may diffuse to unwanted areas
Detail Level: Detailed

## 2020-12-15 ENCOUNTER — APPOINTMENT (RX ONLY)
Dept: URBAN - METROPOLITAN AREA CLINIC 77 | Facility: CLINIC | Age: 45
Setting detail: DERMATOLOGY
End: 2020-12-15

## 2020-12-15 DIAGNOSIS — L72.0 EPIDERMAL CYST: ICD-10-CM

## 2020-12-15 DIAGNOSIS — Z41.9 ENCOUNTER FOR PROCEDURE FOR PURPOSES OTHER THAN REMEDYING HEALTH STATE, UNSPECIFIED: ICD-10-CM

## 2020-12-15 DIAGNOSIS — L81.1 CHLOASMA: ICD-10-CM

## 2020-12-15 PROCEDURE — ? COSMETIC CONSULTATION: BOTULINUM TOXIN

## 2020-12-15 PROCEDURE — ? PRESCRIPTION

## 2020-12-15 PROCEDURE — ? COUNSELING

## 2020-12-15 PROCEDURE — ? BOTOX

## 2020-12-15 PROCEDURE — ? OTHER

## 2020-12-15 RX ORDER — TRETIONIN 0.25 MG/G
CREAM TOPICAL QHS
Qty: 1 | Refills: 6 | Status: ERX

## 2020-12-15 ASSESSMENT — LOCATION ZONE DERM: LOCATION ZONE: FACE

## 2020-12-15 ASSESSMENT — LOCATION SIMPLE DESCRIPTION DERM: LOCATION SIMPLE: LEFT CHEEK

## 2020-12-15 ASSESSMENT — LOCATION DETAILED DESCRIPTION DERM: LOCATION DETAILED: LEFT INFERIOR CENTRAL MALAR CHEEK

## 2020-12-15 NOTE — HPI: COSMETIC (BOTOX)
Have You Had Botox Before?: has had botox
Additional History: Patient presents for Botox injections.  Patient states she does not think she needs any units added to her chin area, but is requesting the forehead and periorbital areas today.
When Was Your Last Botox Treatment?: 09/22/2020

## 2020-12-15 NOTE — PROCEDURE: OTHER
Note Text (......Xxx Chief Complaint.): This diagnosis correlates with the
Other (Free Text): Extensive discussion regarding possible filler migration and its effect on fat pad and laxity under eyes. Discussed consulting with oculoplastics for lower bleph to smooth and tighten skin. Can try hyaluronidase on f/u, did not perform today due to concerns that Botox may diffuse to unwanted areas
Detail Level: Detailed
Other (Free Text): Courtesy ED

## 2020-12-15 NOTE — PROCEDURE: BOTOX
Show Periorbital Units: Yes
Price (Use Numbers Only, No Special Characters Or $): 13
Additional Area 4 Units: 0
Consent: Written consent obtained. Risks include but not limited to lid/brow ptosis, bruising, swelling, diplopia, temporary effect, incomplete chemical denervation.
Post-Care Instructions: Patient instructed to not lie down for 4 hours and limit physical activity for 24 hours. Patient instructed not to travel by airplane for 48 hours.
Periorbital Skin Units: 3
Additional Area 2 Location: right subtarsal
Additional Area 1 Units: 1
Lot #: [id number]
Expiration Date (Month Year): 08/2022
Detail Level: Detailed
Dilution (U/0.1 Cc): 4
Forehead Units: 10
Additional Area 1 Location: left Subtarsal

## 2020-12-15 NOTE — PROCEDURE: MIPS QUALITY
Detail Level: Detailed
Quality 431: Preventive Care And Screening: Unhealthy Alcohol Use - Screening: Patient screened for unhealthy alcohol use using a single question and scores less than 2 times per year
Additional Notes: Patient states she has not received the influenza vaccine this season.
Quality 110: Preventive Care And Screening: Influenza Immunization: Influenza Immunization not Administered for Documented Reasons.
Quality 226: Preventive Care And Screening: Tobacco Use: Screening And Cessation Intervention: Patient screened for tobacco use and is an ex/non-smoker

## 2021-03-12 ENCOUNTER — APPOINTMENT (RX ONLY)
Dept: URBAN - METROPOLITAN AREA CLINIC 63 | Facility: CLINIC | Age: 46
Setting detail: DERMATOLOGY
End: 2021-03-12

## 2021-03-12 DIAGNOSIS — Z41.9 ENCOUNTER FOR PROCEDURE FOR PURPOSES OTHER THAN REMEDYING HEALTH STATE, UNSPECIFIED: ICD-10-CM

## 2021-03-12 PROCEDURE — ? BOTOX

## 2021-03-12 PROCEDURE — ? COSMETIC CONSULTATION: BOTULINUM TOXIN

## 2021-04-16 ENCOUNTER — APPOINTMENT (RX ONLY)
Dept: URBAN - METROPOLITAN AREA CLINIC 63 | Facility: CLINIC | Age: 46
Setting detail: DERMATOLOGY
End: 2021-04-16

## 2021-04-16 DIAGNOSIS — Z41.9 ENCOUNTER FOR PROCEDURE FOR PURPOSES OTHER THAN REMEDYING HEALTH STATE, UNSPECIFIED: ICD-10-CM

## 2021-04-16 PROCEDURE — ? JUVEDERM VOLLURE XC INJECTION

## 2021-04-16 ASSESSMENT — LOCATION SIMPLE DESCRIPTION DERM
LOCATION SIMPLE: LEFT UPPER LIP
LOCATION SIMPLE: RIGHT UPPER LIP
LOCATION SIMPLE: RIGHT CHEEK
LOCATION SIMPLE: LEFT LIP

## 2021-04-16 ASSESSMENT — LOCATION ZONE DERM
LOCATION ZONE: FACE
LOCATION ZONE: LIP

## 2021-04-16 ASSESSMENT — LOCATION DETAILED DESCRIPTION DERM
LOCATION DETAILED: RIGHT SUPERIOR VERMILION BORDER
LOCATION DETAILED: RIGHT INFERIOR MEDIAL MALAR CHEEK
LOCATION DETAILED: LEFT SUPERIOR VERMILION BORDER
LOCATION DETAILED: LEFT UPPER CUTANEOUS LIP

## 2021-04-16 NOTE — PROCEDURE: JUVEDERM VOLLURE XC INJECTION
Additional Area 3 Volume In Cc: 0
Use Map Statement For Sites (Optional): No
Vermilion Lips Filler Volume In Cc: 0.2
Anesthesia Type: 1% lidocaine with epinephrine
Lot #: [id number]
Number Of Syringes (Required For Inventory): 1
Detail Level: Detailed
Anesthesia Volume In Cc: 0.5
Map Statment: See Attach Map for Complete Details
Procedural Text: The filler was administered to the treatment areas noted above.
Consent: Written consent obtained. Risks include but not limited to bruising, beading, irregular texture, ulceration, infection, allergic reaction, scar formation, incomplete augmentation, temporary nature, procedural pain.
Post-Care Instructions: Patient instructed to apply ice to reduce swelling.
Expiration Date (Month Year): 2022-05-15
Nasolabial Folds Filler Volume In Cc: 0.4
Filler: Juvederm Vollure XC
Price (Use Numbers Only, No Special Characters Or $): 514

## 2021-06-18 ENCOUNTER — APPOINTMENT (RX ONLY)
Dept: URBAN - METROPOLITAN AREA CLINIC 63 | Facility: CLINIC | Age: 46
Setting detail: DERMATOLOGY
End: 2021-06-18

## 2021-06-18 DIAGNOSIS — Z41.9 ENCOUNTER FOR PROCEDURE FOR PURPOSES OTHER THAN REMEDYING HEALTH STATE, UNSPECIFIED: ICD-10-CM

## 2021-06-18 PROCEDURE — ? COSMETIC CONSULTATION: BOTULINUM TOXIN

## 2021-06-18 PROCEDURE — ? BOTOX

## 2021-06-18 NOTE — PROCEDURE: BOTOX
Show Additional Area 1: Yes
Right Periorbital Units: 0
Consent: Written consent obtained. Risks include but not limited to lid/brow ptosis, bruising, swelling, diplopia, temporary effect, incomplete chemical denervation.
Lot #: [id number]
Expiration Date (Month Year): 08/2022
Dilution (U/0.1 Cc): 4
Periorbital Skin Units: 24
Detail Level: Detailed
Post-Care Instructions: Patient instructed to not lie down for 4 hours and limit physical activity for 24 hours. Patient instructed not to travel by airplane for 48 hours.
Price (Use Numbers Only, No Special Characters Or $): 13
Glabellar Complex Units: 20

## 2021-12-17 ENCOUNTER — APPOINTMENT (RX ONLY)
Dept: URBAN - METROPOLITAN AREA CLINIC 63 | Facility: CLINIC | Age: 46
Setting detail: DERMATOLOGY
End: 2021-12-17

## 2021-12-17 DIAGNOSIS — B36.0 PITYRIASIS VERSICOLOR: ICD-10-CM

## 2021-12-17 PROCEDURE — ? COUNSELING

## 2021-12-17 PROCEDURE — ? PRESCRIPTION

## 2021-12-17 PROCEDURE — 99214 OFFICE O/P EST MOD 30 MIN: CPT | Mod: 95

## 2021-12-17 PROCEDURE — ? TREATMENT REGIMEN

## 2021-12-17 RX ORDER — ECONAZOLE NITRATE 10 MG/G
CREAM TOPICAL
Qty: 85 | Refills: 6 | Status: ERX | COMMUNITY
Start: 2021-12-17

## 2021-12-17 RX ORDER — FLUCONAZOLE 150 MG/1
TABLET ORAL
Qty: 3 | Refills: 0 | Status: ERX | COMMUNITY
Start: 2021-12-17

## 2021-12-17 RX ORDER — CICLOPIROX 10 MG/.96ML
SHAMPOO TOPICAL
Qty: 120 | Refills: 6 | Status: ERX | COMMUNITY
Start: 2021-12-17

## 2021-12-17 RX ADMIN — CICLOPIROX: 10 SHAMPOO TOPICAL at 00:00

## 2021-12-17 RX ADMIN — FLUCONAZOLE: 150 TABLET ORAL at 00:00

## 2021-12-17 RX ADMIN — ECONAZOLE NITRATE: 10 CREAM TOPICAL at 00:00

## 2021-12-17 ASSESSMENT — LOCATION DETAILED DESCRIPTION DERM: LOCATION DETAILED: RIGHT SUPERIOR MEDIAL MIDBACK

## 2021-12-17 ASSESSMENT — LOCATION ZONE DERM: LOCATION ZONE: TRUNK

## 2021-12-17 ASSESSMENT — LOCATION SIMPLE DESCRIPTION DERM: LOCATION SIMPLE: RIGHT LOWER BACK

## 2021-12-17 NOTE — PROCEDURE: TREATMENT REGIMEN
Initiate Treatment: Diflucan 150 mg once daily x 3 days \\nCiclopirox shampoo daily; let sit 3-5 mins then rinse \\nEconazole 1% cream twice daily
Detail Level: Zone

## 2021-12-17 NOTE — HPI: RASH
What Type Of Note Output Would You Prefer (Optional)?: Standard Output
Is The Patient Presenting As Previously Scheduled?: No, they are a work-in
How Severe Is Your Rash?: moderate
Is This A New Presentation, Or A Follow-Up?: Rash
Additional History: Pt would like to have the rash evaluated to make sure not an immune deficiency problem. She denies doing hot yoga anymore at this time but has been under a lot of stress.
